# Patient Record
(demographics unavailable — no encounter records)

---

## 2017-01-03 NOTE — EDDOCDS
Nurse's Notes                                                                                     

[facility]                                                                         

Name: Oleksandr Goel                                                                              

Age: 43 yrs                                                                                       

Sex: Male                                                                                         

: 1973                                                                                   

MRN: [id number]                                                                                     

Arrival Date: 2017                                                                          

Time: 09:38                                                                                       

Account#: [id number]                                                                              

Bed 30                                                                                            

Private MD: Unknown Pcp                                                                           

Diagnosis: Panic disorder [episodic paroxysmal anxiety] without agoraphobia                       

                                                                                                  

Presentation:                                                                                     

                                                                                             

09:45 Presenting complaint: Patient states: i woke up at 0600 with heart racing, SOB and      srm 

      weakness. when asked if hes feeling anxious pt states " too the extreme". symptoms          

      worse than normal for his anxiety attacks. denies SI or HI.                                 

09:48 Mental Health Triage Level: Level 1- Pt displays no suicidal or homicidal ideations and srm 

      does not appear to be a danger to self or others. Adult Sepsis Screening: The patient       

      does not have new or worsening altered mentation. Patient's respiratory rate is less        

      than 22. Systolic blood pressure is greater than 100. Patient has a qSOFA score of 0-       

      Negative Sepsis Screen. Suicide/Homicide risk assessment- Patient denies SI and HI but      

      presents with another emotional, behavioral or other mental health complaint.               

      Suicide/Homicide risk assessment- The patient reports that he/she has not been admitted     

      to an inpatient mental health facility in the last 30 days. The patient reports that        

      he/she does not have a recent or current history of substance abuse. The patient            

      reports that he/she has no prior history of suicide attempt and/or organized plan. The      

      patient reports that he/she has not experienced a significant life altering event in        

      the last 30 days. The patient reports that he/she has adequate social support.      

      Status: Patient is not a  or  dependent. Transition of care:          

      patient was not received from another setting of care.                                      

09:48 Acuity: ABUNDIO Level 3                                                                     srm 

09:48 Method Of Arrival: Walkin/Carried/Asstd                                                 srm 

                                                                                                  

Triage Assessment:                                                                                

09:48 General: Appears in no apparent distress, Behavior is appropriate for age, cooperative. srm 

      Pain: Denies pain. HIV screening NA for this visit Offered previously.                      

                                                                                                  

Historical:                                                                                       

- Allergies: Vicodin;                                                                             

- Home Meds:                                                                                      

1. lithium carbonate 300 mg Oral cap 1 cap every other day                                      

     (Last dose: 2017)                                                                      

2. Seroquel 50 mg Oral tab nightly                                                              

     (Last dose: 2017)                                                                      

3. sertraline 100 mg oral tab 2 tabs once daily                                                 

     (Last dose: 2017)                                                                      

4. pantoprazole 40 mg oral tab 1 tab once daily                                                 

5. Ventolin Rotahaler/Rotacaps Inhl                                                             

6. diazepam 5 mg Oral tab 1 tab daily hasnt taken in awhile- a week or more                     

- PMHx: Anxiety;                                                                                  

- PSHx: Cholecystectomy;                                                                          

- Social history: Smoking status: Patient states was never smoker of tobacco. No                  

barriers to communication noted, The patient speaks fluent English, Speaks                      

appropriately for age.                                                                          

- Family history: Not pertinent.                                                                  

- : The pt / caregiver states he / she is not on anticoagulants. Home medication list             

is obtained from the patient.                                                                   

- Exposure Risk Screening:: None identified.                                                      

                                                                                                  

                                                                                                  

Screenin:51 Screening information is obtained from the patient. Fall risk: No risks identified.     srm 

      Assistance ADL's: requires no assistance with activities of daily living. Abuse/DV          

      Screen: The patient / caregiver reports he/she is: not in a situation that causes fear,     

      pain or injury. Nutritional screening: No deficits noted. Advance Directives: There is      

      no active DNR order. home support is adequate.                                              

                                                                                                  

Assessment:                                                                                       

09:51 General: Appears in no apparent distress, Behavior is appropriate for age, cooperative. srm 

      Neurological: No deficits noted. EENT: No deficits noted. Cardiovascular: Reports           

      palpitations, shortness of breath. Respiratory: No deficits noted. GI: No deficits          

      noted.                                                                                      

10:45 General: Appears in no apparent distress, Behavior is appropriate for age, cooperative, srm 

      AWAITING PROVIDER EXAM.                                                                     

10:56 General: Appears in no apparent distress,  in speaking with pt.            4 

11:33 General: Appears in no apparent distress.                                               4 

11:34 Reassessment: Patient states feeling better. Patient states symptoms have improved.     4 

                                                                                                  

Social Work Consult:                                                                              

10:59 Social Work Note: Pt presented c/o anxiety. Pt states he is in treatment at [facility]  

      [facility] clinic, has appointment with Ban Wagner next Tuesday. Pt denies SI/HI, no     

      AH/VH. Pt states he came to ED primarily because he was having "palpitations" and is        

      more concerned about getting something for his palpitations. Pt denies any unmet            

      psycho-social needs at this time.                                                           

                                                                                                  

Psych:                                                                                            

09:52 Mental Health Triage Level: Level 1- Pt displays no suicidal or homicidal ideations and srm 

      does not appear to be a danger to self or others.                                           

09:52 Objective: Patient is cooperative,  Speech is normal.                                       

                                                                                                  

Vital Signs:                                                                                      

09:41  / 67; Pulse 69; Resp 18; Temp 97.4(O); Pulse Ox 98% ; Weight 107.05 kg; Height 6 cmb 

      ft. 0 in. (182.88 cm); Pain 0/10;                                                           

11:35  / 72; Pulse 65; Resp 20; Temp 97.9; Pulse Ox 96% ; Pain 0/10;                    jam1

09:41 Body Mass Index 32.01 (107.05 kg, 182.88 cm)                                            cmb 

                                                                                                  

Vitals:                                                                                           

09:41 Log In Time: 2017 at 09:38.                                                 cmb 

                                                                                                  

ED Course:                                                                                        

09:40 Patient visited by Sadaf Hampton.                                                    cmb 

09:40 Patient moved to Waiting                                                                cmb 

09:41 Unknown Pcp is Private Physician.                                                       cmb 

09:49 Triage Initiated                                                                        srm 

09:50 Patient moved to 30                                                                     srm 

09:51 The patient / caregiver is instructed regarding the plan of care and ED course. Patient srm 

      has correct armband on for positive identification.                                         

10:00 EKG done. (by ED staff). Reviewed by Anita Wilde MD.                          srm 

10:45 Patient visited by Sneha Gomes RN.                                                srm 

11:15 [facility]                                                        ar2 

11:15 Anita Wilde MD is Attending Physician.                                      ar2 

11:16 Patient visited by Marcio Jha PA-C.                                             ar2 

11:34 No IV's were initiated during this patient's visit. No procedures done that require     mk4 

      assistance.                                                                                 

                                                                                                  

Order Results:                                                                                    

There are currently no results for this order.                                                    

Outcome:                                                                                          

11:26 Discharge ordered by Provider.                                                          ar2 

11:34 Discharge Assessment: Patient awake, alert and oriented x 3. No cognitive and/or        mk4 

      functional deficits noted. Patient verbalized understanding of disposition                  

      instructions. Patient awake and alert. patient administered narcotics - no. The             

      following High Risk Discharge criteria are identified: None. Discharged to home             

      ambulatory. Condition: good Condition: stable. No special radiology studies were            

      completed. Property sent home with patient.                                                 

11:36 Patient left the ED.                                                                    mk4 

                                                                                                  

Signatures:                                                                                       

Sneha Gomes, JOSE JUAN                    RN   srm                                                  

Nayeli Silverman, GAURAV                       PCA  Demetrius Jimenez, PSA                       PSA  ac                                                   

Marcio Jha PA-C                 PASindyC ar2                                                  

Sadaf Hampton Margaret, RN                      RN   mk4                                                  

                                                                                                  

**************************************************************************************************

MTDD

## 2017-01-03 NOTE — EDDOCDS
Physician Documentation                                                                           

[facility]                                                                         

Name: Oleksandr Goel                                                                              

Age: 43 yrs                                                                                       

Sex: Male                                                                                         

: 1973                                                                                   

MRN: [id number]                                                                                     

Arrival Date: 2017                                                                          

Time: 09:38                                                                                       

Account#: [id number]                                                                              

Bed 30                                                                                            

Private MD: Unknown Pcp                                                                           

Disposition:                                                                                      

17 11:26 Discharged to Home/Self Care. Impression: Panic disorder [episodic paroxysmal      

anxiety] without agoraphobia.                                                                   

- Condition is Stable.                                                                            

- Discharge Instructions: Panic Attacks.                                                          

- Prescriptions for Hydroxyzine HCl 25 mg Oral Tablet - take 1 tablet by ORAL route               

every 6 hours As needed; 30 tablet.                                                             

- Medication Reconciliation, Local Pharmacy Hours form.                                           

- Follow up: Private Physician; When: As previously arranged; Reason: Recheck today's             

complaints, Continuance of care. Follow up: Emergency Department; When: As needed;              

Reason: Worsening of conditions.                                                                

- Problem is new.                                                                                 

- Symptoms have improved.                                                                         

                                                                                                  

                                                                                                  

                                                                                                  

Historical:                                                                                       

- Allergies: Vicodin;                                                                             

- Home Meds:                                                                                      

1. lithium carbonate 300 mg Oral cap 1 cap every other day                                      

     (Last dose: 2017)                                                                      

2. Seroquel 50 mg Oral tab nightly                                                              

     (Last dose: 2017)                                                                      

3. sertraline 100 mg oral tab 2 tabs once daily                                                 

     (Last dose: 2017)                                                                      

4. pantoprazole 40 mg oral tab 1 tab once daily                                                 

5. Ventolin Rotahaler/Rotacaps Inhl                                                             

6. diazepam 5 mg Oral tab 1 tab daily hasnt taken in awhile- a week or more                     

- PMHx: Anxiety;                                                                                  

- PSHx: Cholecystectomy;                                                                          

- Social history: Smoking status: Patient states was never smoker of tobacco. No                  

barriers to communication noted, The patient speaks fluent English, Speaks                      

appropriately for age.                                                                          

- Family history: Not pertinent.                                                                  

- : The pt / caregiver states he / she is not on anticoagulants. Home medication list             

is obtained from the patient.                                                                   

- Exposure Risk Screening:: None identified.                                                      

                                                                                                  

                                                                                                  

Vital Signs:                                                                                      

                                                                                             

09:41  / 67; Pulse 69; Resp 18; Temp 97.4(O); Pulse Ox 98% ; Weight 107.05 kg / 236     cmb 

      lbs; Height 6 ft. 0 in. (182.88 cm); Pain 0/10;                                             

11:35  / 72; Pulse 65; Resp 20; Temp 97.9; Pulse Ox 96% ; Pain 0/10;                    jam1

09:41 Body Mass Index 32.01 (107.05 kg, 182.88 cm)                                            cmb 

                                                                                                  

MDM:                                                                                              

09:50 ECG WITH READING ER PHYS+CARDIAG ordered.                                               EDMS

10:45 REGULAR DIET PLASTIC WARE+DIET ordered.                                                 EDMS

11:34 Financial registration complete.                                                        lg  

                                                                                                  

Signatures:                                                                                       

Dispatcher [employer]                           EDMS                                                 

Sneha Gomes, RN                    RN   srm                                                  

Anjali Arvizu, Reg                    Reg  lg                                                   

Marcio Jha PA-C PA-C ar2                                                  

Jaqueline Javier RN                      RN   mk4                                                  

                                                                                                  

**************************************************************************************************

MTDD

## 2017-01-04 NOTE — ECGEPIP
Stationary ECG Study

                           [facility] - ED

                                       

                                       Test Date:    2017

Pat Name:     ANITHA SILVER           Department:   

Patient ID:   [id number]                 Room:         -

Gender:       M                        Technician:   lily

:          1973               Requested By: Anita Wilde 

Order Number: [id number]     Reading MD:   Anita Wilde

                                 Measurements

Intervals                              Axis          

Rate:         63                       P:            42

FL:           137                      QRS:          -20

QRSD:         108                      T:            22

QT:           375                                    

QTc:          384                                    

                           Interpretive Statements

SINUS RHYTHM

?PRIOR INFERIOR INFARCT COMPARED 16

Electronically Signed On 2017 19:35:45 EST by Anita Wilde

## 2017-01-05 NOTE — EDDOCDS
Nurse's Notes                                                                                     

[facility]                                                                         

Name: Oleksandr Goel                                                                              

Age: 43 yrs                                                                                       

Sex: Male                                                                                         

: 1973                                                                                   

MRN: [id number]                                                                                     

Arrival Date: 2017                                                                          

Time: 09:38                                                                                       

Account#: [id number]                                                                              

Bed 30                                                                                            

Private MD: Unknown Pcp                                                                           

Diagnosis: Panic disorder [episodic paroxysmal anxiety] without agoraphobia                       

                                                                                                  

Presentation:                                                                                     

                                                                                             

09:45 Presenting complaint: Patient states: i woke up at 0600 with heart racing, SOB and      srm 

      weakness. when asked if hes feeling anxious pt states " too the extreme". symptoms          

      worse than normal for his anxiety attacks. denies SI or HI.                                 

09:48 Mental Health Triage Level: Level 1- Pt displays no suicidal or homicidal ideations and srm 

      does not appear to be a danger to self or others. Adult Sepsis Screening: The patient       

      does not have new or worsening altered mentation. Patient's respiratory rate is less        

      than 22. Systolic blood pressure is greater than 100. Patient has a qSOFA score of 0-       

      Negative Sepsis Screen. Suicide/Homicide risk assessment- Patient denies SI and HI but      

      presents with another emotional, behavioral or other mental health complaint.               

      Suicide/Homicide risk assessment- The patient reports that he/she has not been admitted     

      to an inpatient mental health facility in the last 30 days. The patient reports that        

      he/she does not have a recent or current history of substance abuse. The patient            

      reports that he/she has no prior history of suicide attempt and/or organized plan. The      

      patient reports that he/she has not experienced a significant life altering event in        

      the last 30 days. The patient reports that he/she has adequate social support.      

      Status: Patient is not a  or  dependent. Transition of care:          

      patient was not received from another setting of care.                                      

09:48 Acuity: ABUNDIO Level 3                                                                     srm 

09:48 Method Of Arrival: Walkin/Carried/Asstd                                                 srm 

                                                                                                  

Triage Assessment:                                                                                

09:48 General: Appears in no apparent distress, Behavior is appropriate for age, cooperative. srm 

      Pain: Denies pain. HIV screening NA for this visit Offered previously.                      

                                                                                                  

Historical:                                                                                       

- Allergies: Vicodin;                                                                             

- Home Meds:                                                                                      

1. lithium carbonate 300 mg Oral cap 1 cap every other day                                      

     (Last dose: 2017)                                                                      

2. Seroquel 50 mg Oral tab nightly                                                              

     (Last dose: 2017)                                                                      

3. sertraline 100 mg oral tab 2 tabs once daily                                                 

     (Last dose: 2017)                                                                      

4. pantoprazole 40 mg oral tab 1 tab once daily                                                 

5. Ventolin Rotahaler/Rotacaps Inhl                                                             

6. diazepam 5 mg Oral tab 1 tab daily hasnt taken in awhile- a week or more                     

- PMHx: Anxiety;                                                                                  

- PSHx: Cholecystectomy;                                                                          

- Social history: Smoking status: Patient states was never smoker of tobacco. No                  

barriers to communication noted, The patient speaks fluent English, Speaks                      

appropriately for age.                                                                          

- Family history: Not pertinent.                                                                  

- : The pt / caregiver states he / she is not on anticoagulants. Home medication list             

is obtained from the patient.                                                                   

- Exposure Risk Screening:: None identified.                                                      

                                                                                                  

                                                                                                  

Screenin:51 Screening information is obtained from the patient. Fall risk: No risks identified.     srm 

      Assistance ADL's: requires no assistance with activities of daily living. Abuse/DV          

      Screen: The patient / caregiver reports he/she is: not in a situation that causes fear,     

      pain or injury. Nutritional screening: No deficits noted. Advance Directives: There is      

      no active DNR order. home support is adequate.                                              

                                                                                                  

Assessment:                                                                                       

09:51 General: Appears in no apparent distress, Behavior is appropriate for age, cooperative. srm 

      Neurological: No deficits noted. EENT: No deficits noted. Cardiovascular: Reports           

      palpitations, shortness of breath. Respiratory: No deficits noted. GI: No deficits          

      noted.                                                                                      

10:45 General: Appears in no apparent distress, Behavior is appropriate for age, cooperative, srm 

      AWAITING PROVIDER EXAM.                                                                     

10:56 General: Appears in no apparent distress,  in speaking with pt.            4 

11:33 General: Appears in no apparent distress.                                               4 

11:34 Reassessment: Patient states feeling better. Patient states symptoms have improved.     4 

                                                                                                  

Social Work Consult:                                                                              

10:59 Social Work Note: Pt presented c/o anxiety. Pt states he is in treatment at [facility]  

      Wellness clinic, has appointment with Ban Wagner next Tuesday. Pt denies SI/HI, no     

      AH/VH. Pt states he came to ED primarily because he was having "palpitations" and is        

      more concerned about getting something for his palpitations. Pt denies any unmet            

      psycho-social needs at this time.                                                           

                                                                                                  

Psych:                                                                                            

09:52 Mental Health Triage Level: Level 1- Pt displays no suicidal or homicidal ideations and srm 

      does not appear to be a danger to self or others.                                           

09:52 Objective: Patient is cooperative,  Speech is normal.                                       

                                                                                                  

Vital Signs:                                                                                      

09:41  / 67; Pulse 69; Resp 18; Temp 97.4(O); Pulse Ox 98% ; Weight 107.05 kg; Height 6 cmb 

      ft. 0 in. (182.88 cm); Pain 0/10;                                                           

11:35  / 72; Pulse 65; Resp 20; Temp 97.9; Pulse Ox 96% ; Pain 0/10;                    jam1

09:41 Body Mass Index 32.01 (107.05 kg, 182.88 cm)                                            cmb 

                                                                                                  

Vitals:                                                                                           

09:41 Log In Time: 2017 at 09:38.                                                 cmb 

                                                                                                  

ED Course:                                                                                        

09:40 Patient visited by Sadaf Hampton.                                                    cmb 

09:40 Patient moved to Waiting                                                                cmb 

09:41 Unknown Pcp is Private Physician.                                                       cmb 

09:49 Triage Initiated                                                                        srm 

09:50 Patient moved to 30                                                                     srm 

09:51 The patient / caregiver is instructed regarding the plan of care and ED course. Patient srm 

      has correct armband on for positive identification.                                         

10:00 EKG done. (by ED staff). Reviewed by Anita Wilde MD.                          srm 

10:45 Patient visited by Sneha Gomes RN.                                                srm 

11:15 Marcio Jha PA-C is PHCP.                                                        ar2 

11:15 Anita Wilde MD is Attending Physician.                                      ar2 

11:16 Patient visited by Marcio Jha PA-C.                                             ar2 

11:34 No IV's were initiated during this patient's visit. No procedures done that require     mk4 

      assistance.                                                                                 

12:38 T-Sheet-- Draft Copy was scanned into [email or web address] and attached to record.                   gb  

12:38 ECG/EKG was scanned into [email or web address] and attached to record.                                gb  

                                                                                             

07:51 [facility] Payment Agreement was scanned into [email or web address] and attached to record.               lg  

20:04 EKG-ADULT Returned.                                                                     EDMS

                                                                                                  

Order Results:                                                                                    

                                                                                                  

Radiology Order: EKG-ADULT                                                                        

      Test: EKG-ADULT                                                                             

      REASON FOR EXAMINATION: heart racing; Stationary ECG Study; [facility] - ED; ;      

      Test Date: 2017; Pat Name: OLEKSANDR GOEL Department:; Patient ID: [id number] Room:     

      -; Gender: M Technician: lily; : 1973 Requested By: Anita Wilde; Order     

      Number: [id number] Reading MD: Anita Wilde; Measurements; Intervals     

      Axis; Rate: 63 P: 42; WV: 137 QRS: -20; QRSD: 108 T: 22; QT: 375; QTc: 384;                 

      Interpretive Statements; SINUS RHYTHM; ?PRIOR INFERIOR INFARCT COMPARED 16;           

      Electronically Signed On 2017 19:35:45 EST by Anita Wilde;                    

Outcome:                                                                                          

                                                                                             

11:26 Discharge ordered by Provider.                                                          ar2 

11:34 Discharge Assessment: Patient awake, alert and oriented x 3. No cognitive and/or        mk4 

      functional deficits noted. Patient verbalized understanding of disposition                  

      instructions. Patient awake and alert. patient administered narcotics - no. The             

      following High Risk Discharge criteria are identified: None. Discharged to home             

      ambulatory. Condition: good Condition: stable. No special radiology studies were            

      completed. Property sent home with patient.                                                 

11:36 Patient left the ED.                                                                    mk4 

                                                                                                  

Signatures:                                                                                       

Dispatcher [employer]                           EDMS                                                 

Sneha Gomes, RN                    RN   srm                                                  

Nayeli Silverman, PCA                       PCA  jam1                                                 

Demetrius Murguia, PSA                       PSA  ac                                                   

Whitney Flores, Reg                  Reg  gb                                                   

Anjali Arvizu, Reg                    Reg  lg                                                   

Marcio Jha, PA-CHAPARRITA                 PAJAZMYNE ar2                                                  

Sadaf Hampton Margaret, RN                      RN   mk4                                                  

                                                                                                  

**************************************************************************************************



*** Chart Complete ***
MTDD

## 2017-01-05 NOTE — EDDOCDS
Physician Documentation                                                                           

[facility]                                                                         

Name: Oleksandr Goel                                                                              

Age: 43 yrs                                                                                       

Sex: Male                                                                                         

: 1973                                                                                   

MRN: [id number]                                                                                     

Arrival Date: 2017                                                                          

Time: 09:38                                                                                       

Account#: [id number]                                                                              

Bed 30                                                                                            

Private MD: Unknown Pcp                                                                           

Disposition:                                                                                      

17 11:26 Discharged to Home/Self Care. Impression: Panic disorder [episodic paroxysmal      

anxiety] without agoraphobia.                                                                   

- Condition is Stable.                                                                            

- Discharge Instructions: Panic Attacks.                                                          

- Prescriptions for Hydroxyzine HCl 25 mg Oral Tablet - take 1 tablet by ORAL route               

every 6 hours As needed; 30 tablet.                                                             

- Medication Reconciliation, Local Pharmacy Hours form.                                           

- Follow up: Private Physician; When: As previously arranged; Reason: Recheck today's             

complaints, Continuance of care. Follow up: Emergency Department; When: As needed;              

Reason: Worsening of conditions.                                                                

- Problem is new.                                                                                 

- Symptoms have improved.                                                                         

                                                                                                  

                                                                                                  

                                                                                                  

Historical:                                                                                       

- Allergies: Vicodin;                                                                             

- Home Meds:                                                                                      

1. lithium carbonate 300 mg Oral cap 1 cap every other day                                      

     (Last dose: 2017)                                                                      

2. Seroquel 50 mg Oral tab nightly                                                              

     (Last dose: 2017)                                                                      

3. sertraline 100 mg oral tab 2 tabs once daily                                                 

     (Last dose: 2017)                                                                      

4. pantoprazole 40 mg oral tab 1 tab once daily                                                 

5. Ventolin Rotahaler/Rotacaps Inhl                                                             

6. diazepam 5 mg Oral tab 1 tab daily hasnt taken in awhile- a week or more                     

- PMHx: Anxiety;                                                                                  

- PSHx: Cholecystectomy;                                                                          

- Social history: Smoking status: Patient states was never smoker of tobacco. No                  

barriers to communication noted, The patient speaks fluent English, Speaks                      

appropriately for age.                                                                          

- Family history: Not pertinent.                                                                  

- : The pt / caregiver states he / she is not on anticoagulants. Home medication list             

is obtained from the patient.                                                                   

- Exposure Risk Screening:: None identified.                                                      

                                                                                                  

                                                                                                  

Vital Signs:                                                                                      

                                                                                             

09:41  / 67; Pulse 69; Resp 18; Temp 97.4(O); Pulse Ox 98% ; Weight 107.05 kg / 236     cmb 

      lbs; Height 6 ft. 0 in. (182.88 cm); Pain 0/10;                                             

11:35  / 72; Pulse 65; Resp 20; Temp 97.9; Pulse Ox 96% ; Pain 0/10;                    jam1

09:41 Body Mass Index 32.01 (107.05 kg, 182.88 cm)                                            cmb 

                                                                                                  

MDM:                                                                                              

09:50 ECG WITH READING ER PHYS+CARDIAG ordered.                                               EDMS

10:45 REGULAR DIET PLASTIC WARE+DIET ordered.                                                 EDMS

11:34 Financial registration complete.                                                        lg  

12:38 T-Sheet-- Draft Copy was scanned into [employer] and attached to record.                   gb  

12:38 ECG/EKG was scanned into [employer] and attached to record.                                gb  

                                                                                             

07:51 [facility] Payment Agreement was scanned into [employer] and attached to record.               lg  

                                                                                                  

Signatures:                                                                                       

Dispatcher [employer]                           EDMS                                                 

Sneha Gomes, RN                    RN   srm                                                  

Whitney Flores, Reg                  Reg  gb                                                   

Anjali Arvizu, Reg                    Reg  lg                                                   

Marcio Jha, PA-C                 PA-C ar2                                                  

Jaqueline Javier RN                      RN   mk4                                                  

                                                                                                  

The chart was reviewed and I authenticate all verbal orders and agree with the evaluation and 
treatment provided.Attachments:

                                                                                             

12:38 T-Sheet-- Draft Copy                                                                    gb  

12:38 ECG/EKG                                                                                 gb  

                                                                                             

07:51 [facility] Payment Agreement                                                                lg  

                                                                                                  

**************************************************************************************************



*** Chart Complete ***
MTDD

## 2017-01-05 NOTE — EDDOCDS
Physician Documentation                                                                           

[facility]                                                                         

Name: Oleksandr Goel                                                                              

Age: 43 yrs                                                                                       

Sex: Male                                                                                         

: 1973                                                                                   

MRN: [id number]                                                                                     

Arrival Date: 2017                                                                          

Time: 09:38                                                                                       

Account#: [id number]                                                                              

Bed 30                                                                                            

Private MD: Unknown Pcp                                                                           

Disposition:                                                                                      

17 11:26 Discharged to Home/Self Care. Impression: Panic disorder [episodic paroxysmal      

anxiety] without agoraphobia.                                                                   

- Condition is Stable.                                                                            

- Discharge Instructions: Panic Attacks.                                                          

- Prescriptions for Hydroxyzine HCl 25 mg Oral Tablet - take 1 tablet by ORAL route               

every 6 hours As needed; 30 tablet.                                                             

- Medication Reconciliation, Local Pharmacy Hours form.                                           

- Follow up: Private Physician; When: As previously arranged; Reason: Recheck today's             

complaints, Continuance of care. Follow up: Emergency Department; When: As needed;              

Reason: Worsening of conditions.                                                                

- Problem is new.                                                                                 

- Symptoms have improved.                                                                         

                                                                                                  

                                                                                                  

                                                                                                  

Historical:                                                                                       

- Allergies: Vicodin;                                                                             

- Home Meds:                                                                                      

1. lithium carbonate 300 mg Oral cap 1 cap every other day                                      

     (Last dose: 2017)                                                                      

2. Seroquel 50 mg Oral tab nightly                                                              

     (Last dose: 2017)                                                                      

3. sertraline 100 mg oral tab 2 tabs once daily                                                 

     (Last dose: 2017)                                                                      

4. pantoprazole 40 mg oral tab 1 tab once daily                                                 

5. Ventolin Rotahaler/Rotacaps Inhl                                                             

6. diazepam 5 mg Oral tab 1 tab daily hasnt taken in awhile- a week or more                     

- PMHx: Anxiety;                                                                                  

- PSHx: Cholecystectomy;                                                                          

- Social history: Smoking status: Patient states was never smoker of tobacco. No                  

barriers to communication noted, The patient speaks fluent English, Speaks                      

appropriately for age.                                                                          

- Family history: Not pertinent.                                                                  

- : The pt / caregiver states he / she is not on anticoagulants. Home medication list             

is obtained from the patient.                                                                   

- Exposure Risk Screening:: None identified.                                                      

                                                                                                  

                                                                                                  

Vital Signs:                                                                                      

                                                                                             

09:41  / 67; Pulse 69; Resp 18; Temp 97.4(O); Pulse Ox 98% ; Weight 107.05 kg / 236     cmb 

      lbs; Height 6 ft. 0 in. (182.88 cm); Pain 0/10;                                             

11:35  / 72; Pulse 65; Resp 20; Temp 97.9; Pulse Ox 96% ; Pain 0/10;                    jam1

09:41 Body Mass Index 32.01 (107.05 kg, 182.88 cm)                                            cmb 

                                                                                                  

MDM:                                                                                              

09:50 ECG WITH READING ER PHYS+CARDIAG ordered.                                               EDMS

10:45 REGULAR DIET PLASTIC WARE+DIET ordered.                                                 EDMS

11:34 Financial registration complete.                                                        lg  

12:38 T-Sheet-- Draft Copy was scanned into [employer] and attached to record.                   gb  

12:38 ECG/EKG was scanned into [employer] and attached to record.                                gb  

                                                                                             

07:51 [facility] Payment Agreement was scanned into [employer] and attached to record.               lg  

                                                                                                  

Signatures:                                                                                       

Dispatcher [employer]                           EDMS                                                 

Sneha Gomes, RN                    RN   srm                                                  

Whitney Flores, Reg                  Reg  gb                                                   

Anjali Arvizu, Reg                    Reg  lg                                                   

Marcio Jha, PA-C                 PA-C ar2                                                  

Jaqueline Javier RN                      RN   [id number]                                                  

                                                                                                  

The chart was reviewed and I authenticate all verbal orders and agree with the evaluation and 
treatment provided.Attachments:

                                                                                             

12:38 T-Sheet-- Draft Copy                                                                    gb  

12:38 ECG/EKG                                                                                 gb  

                                                                                             

07:51 [facility] Payment Agreement                                                                lg  

                                                                                                  

**************************************************************************************************



*** Chart Complete ***
MTDD

## 2017-02-22 NOTE — EDDOCDS
Physician Documentation                                                                           

[facility]                                                                         

Name: Oleksandr Goel                                                                              

Age: 43 yrs                                                                                       

Sex: Male                                                                                         

: 1973                                                                                   

MRN: [id number]                                                                                     

Arrival Date: 2017                                                                          

Time: 14:04                                                                                       

Account#: [id number]                                                                              

Bed PR                                                                                      

Private MD: Tarah Jacob A                                                                     

Disposition:                                                                                      

17 15:08 Discharged to Home/Self Care. Impression: Low back pain.                           

- Condition is Stable.                                                                            

- Discharge Instructions: Back Pain, Adult.                                                       

- Prescriptions for Prednisone 20 mg Oral Tablet - take 2 tablets by ORAL route once              

daily for 5 days TAKE WITH FOOD, EARLIER IN THE DAY.; 10 tablet.                                

- Medication Reconciliation, Local Pharmacy Hours form.                                           

- Follow up: Emergency Department; When: As needed; Reason: Worsening of conditions.              

Follow up: Private Physician; When: 2 - 3 days; Reason: Wound/Symptom Recheck,                  

Recheck today's complaints, Continuance of care.                                                

- Problem is new.                                                                                 

- Symptoms are unchanged.                                                                         

                                                                                                  

                                                                                                  

                                                                                                  

Historical:                                                                                       

- Allergies: Vicodin;                                                                             

- Home Meds:                                                                                      

1. diazepam 5 mg Oral tab 1 tab daily hasnt taken in awhile- a week or more                     

2. lithium carbonate 300 mg Oral cap 1 cap every other day                                      

3. pantoprazole 40 mg oral tab 1 tab once daily                                                 

4. Seroquel 50 mg Oral tab nightly                                                              

5. sertraline 100 mg oral tab 2 tabs once daily                                                 

6. Ventolin Rotahaler/Rotacaps Inhl                                                             

- PMHx: Anxiety;                                                                                  

- PSHx: Cholecystectomy;                                                                          

- Social history: Smoking status: Patient states was never smoker of tobacco. No                  

barriers to communication noted, The patient speaks fluent English.                             

- Family history: Not pertinent.                                                                  

- : The pt / caregiver states he / she is not on anticoagulants. Home medication list             

is obtained from the patient.                                                                   

- Exposure Risk Screening:: None identified.                                                      

                                                                                                  

                                                                                                  

Vital Signs:                                                                                      

                                                                                             

14:07  / 73; Pulse 73; Resp 19; Temp 96.6; Pulse Ox 98% ; Weight 108.86 kg / 240 lbs;   elp 

      Height 6 ft. 0 in. (182.88 cm);                                                             

15:16  / 78; Pulse 65; Resp 18; Temp 97.8(O); Pulse Ox 98% on R/A; Pain 3/10;           ck1 

14:07 Body Mass Index 32.55 (108.86 kg, 182.88 cm)                                            elp 

                                                                                                  

MDM:                                                                                              

14:28 Spine. Lumbosacral, Complete Ordered.                                                   EDMS

14:32 Financial registration complete.                                                        lg  

14:37 [facility] Payment Agreement was scanned into [employer] and attached to record.               lg  

                                                                                                  

Signatures:                                                                                       

Dispatcher [employer]                           EDMS                                                 

Rachael Tan RN RN mcp Ganter, LoriLee, Reg                    Reg  lg                                                   

Missy Summers RN RN   ck1                                                  

Lisa Watts, JACLYN ANAYA dt4                                                  

                                                                                                  

The chart was reviewed and I authenticate all verbal orders and agree with the evaluation and 
treatment provided.Attachments:

14:37 [facility] Payment Agreement                                                                lg  

                                                                                                  

**************************************************************************************************

MTDD

## 2017-02-22 NOTE — EDDOCDS
Nurse's Notes                                                                                     

[facility]                                                                         

Name: Oleksandr Goel                                                                              

Age: 43 yrs                                                                                       

Sex: Male                                                                                         

: 1973                                                                                   

MRN: [id number]                                                                                     

Arrival Date: 2017                                                                          

Time: 14:04                                                                                       

Account#: [id number]                                                                              

Bed PR                                                                                      

Private MD: Tarah Jacob A                                                                     

Diagnosis: Low back pain                                                                          

                                                                                                  

Presentation:                                                                                     

                                                                                             

14:09 Presenting complaint: Patient states: Slipped and fell about a month ago--was seen at   [facility] and was given steroid pill x1 and told to take Motrin. Pain persists--no           

      change, worse in am. Radiates to groin and left leg. Adult Sepsis Screening: The            

      patient does not have new or worsening altered mentation. Patient's respiratory rate is     

      less than 22. Systolic blood pressure is greater than 100. Patient has a qSOFA score of     

      0- Negative Sepsis Screen. Suicide/Homicide risk assessment- the patient denies having      

      any suicidal and/or homicidal ideations and does not present with any other emotional,      

      behavioral or mental health complaints.  Status: Patient is not a service           

      member or  dependent. Transition of care: patient was not received from another     

      setting of care.                                                                            

14:09 Acuity: ABUNDIO Level 4                                                                     [facility] 

14:09 Method Of Arrival: Walkin/Carried/Asstd                                                 [facility] 

                                                                                                  

Triage Assessment:                                                                                

14:11 General: Appears uncomfortable, Behavior is cooperative. Pain: Location: lumbar area,   mcp 

      left low back and right low back Pain currently is 10 out of 10 on a pain scale. HIV        

      screening NA for this visit Offered previously. Neurological: No deficits noted.            

      Respiratory: Airway is patent Respiratory effort is even, unlabored. Derm: Skin is          

      pink, warm & dry. Musculoskeletal: Circulation, motion, and sensation intact.             

                                                                                                  

Historical:                                                                                       

- Allergies: Vicodin;                                                                             

- Home Meds:                                                                                      

1. diazepam 5 mg Oral tab 1 tab daily hasnt taken in awhile- a week or more                     

2. lithium carbonate 300 mg Oral cap 1 cap every other day                                      

3. pantoprazole 40 mg oral tab 1 tab once daily                                                 

4. Seroquel 50 mg Oral tab nightly                                                              

5. sertraline 100 mg oral tab 2 tabs once daily                                                 

6. Ventolin Rotahaler/Rotacaps Inhl                                                             

- PMHx: Anxiety;                                                                                  

- PSHx: Cholecystectomy;                                                                          

- Social history: Smoking status: Patient states was never smoker of tobacco. No                  

barriers to communication noted, The patient speaks fluent English.                             

- Family history: Not pertinent.                                                                  

- : The pt / caregiver states he / she is not on anticoagulants. Home medication list             

is obtained from the patient.                                                                   

- Exposure Risk Screening:: None identified.                                                      

                                                                                                  

                                                                                                  

Screenin:30 Screening information is obtained from the patient. Fall risk: No risks identified.     ck1 

      Assistance ADL's: requires no assistance with activities of daily living. Abuse/DV          

      Screen: The patient / caregiver reports he/she is: not in a situation that causes fear,     

      pain or injury. Nutritional screening: No deficits noted. Advance Directives:               

      Currently, there is no health care proxy. home support is adequate.                         

                                                                                                  

Assessment:                                                                                       

14:31 General: Appears in no apparent distress, comfortable, Behavior is appropriate for age, ck1 

      cooperative. Pain: Location: left low back Pain currently is 7 out of 10 on a pain          

      scale. Pain radiates to left leg. Derm: Skin is intact, is healthy with good turgor,        

      Skin is pink, warm & dry. Musculoskeletal: Circulation, motion, and sensation intact      

      Range of motion intact in all extremities.                                                  

15:17 General: Appears in no apparent distress, comfortable, Behavior is appropriate for age, ck1 

      cooperative. Pain: Location: left low back Pain currently is 3 out of 10 on a pain          

      scale. Neurological: Level of Consciousness is awake, alert, obeys commands, Oriented       

      to person, place, time. Derm: Skin is pink, warm & dry. Musculoskeletal: Circulation,     

      motion, and sensation intact Range of motion intact in all extremities.                     

                                                                                                  

Vital Signs:                                                                                      

14:07  / 73; Pulse 73; Resp 19; Temp 96.6; Pulse Ox 98% ; Weight 108.86 kg; Height 6    elp 

      ft. 0 in. (182.88 cm);                                                                      

15:16  / 78; Pulse 65; Resp 18; Temp 97.8(O); Pulse Ox 98% on R/A; Pain 3/10;           ck1 

14:07 Body Mass Index 32.55 (108.86 kg, 182.88 cm)                                            elp 

                                                                                                  

Vitals:                                                                                           

14:07 Log In Time: 2017 at 14:05.                                                elp 

                                                                                                  

ED Course:                                                                                        

14:05 Patient visited by Ashley Robertson PCA.                                                  elp 

14:05 Patient moved to Waiting                                                                elp 

14:06 Tarah Jacob is Private Physician.                                                    elp 

14:07 Patient visited by Ashley Robertson PCA.                                                  elp 

14:07 Patient moved to Pre RCE                                                                elp 

14:10 Triage Initiated                                                                        [facility] 

14:11 Patient visited by Rachael Tan RN.                                                    [facility] 

14:11 Patient moved to Triage 2                                                               [facility] 

14:20 [facility]                                                           dt4 

14:20 Anita Wilde MD is Attending Physician.                                      dt4 

14:20 Patient visited by Lisa Watts PA-C.                                                dt4 

14:31 The patient / caregiver is instructed regarding the plan of care and ED course.         ck1 

14:31 Patient moved to TR1                                                                    ck1 

14:37 [facility] Payment Agreement was scanned into [employer] and attached to record.               lg  

14:52 Patient visited by Missy Summers,JOSE JUAN.                                              ck1 

15:11 Patient moved to PR1 / 25                                                               ck1 

15:17 No IV's were initiated during this patient's visit. No procedures done that require     ck1 

      assistance.                                                                                 

                                                                                                  

Order Results:                                                                                    

There are currently no results for this order.                                                    

Outcome:                                                                                          

15:08 Discharge ordered by Provider.                                                          dt4 

15:16 Discharge Assessment: Patient awake, alert and oriented x 3. No cognitive and/or        ck1 

      functional deficits noted. Patient verbalized understanding of disposition                  

      instructions. patient administered narcotics - no. The following High Risk Discharge        

      criteria are identified: None. Discharged to home ambulatory. Condition: stable.            

      Discharge instructions given to patient, Instructed on discharge instructions, follow       

      up and referral plans. medication usage, Demonstrated understanding of instructions,        

      medications, Pt was receptive of discharge instructions/ teaching. Prescriptions given      

      X 1. No special radiology studies were completed. Property :Personal belongings             

      accompany Pt.                                                                               

15:17 Patient left the ED.                                                                    ck1 

                                                                                                  

Signatures:                                                                                       

Rachael Tan, JOSE JUAN                        RN   [facility]                                                  

Anjali Arvizu, Reg                    Reg                                                     

Missy Summers,JOSE JUAN                  RN   ck                                                  

Ashley Robertson PCA                      PCA  elp                                                  

Lisa Watts PA-C PA-C dt4                                                  

                                                                                                  

**************************************************************************************************

MTDD

## 2017-02-22 NOTE — REP
LUMBAR SPINE:

 

This study is compared to that on 04/18/2013.

 

The bone density is normal.

 

At L1-L2 left side, there is large bridging osteophyte or spurs unchanged. The

vertebral bodies show degenerative spurring particularly anteriorly. This also

extends or includes the thoracic segments. The disc spaces are maintained.

 

The facets, SI and hip joints are within normal limits.

 

The soft tissues of the abdomen and pelvis are unremarkable.

 

IMPRESSION:

There is degenerative spurring of the spine with bridging osteophytes at L1-L2

left side. No disc space narrowing or other significant change.

 

Unreviewed

## 2017-02-24 NOTE — EDDOCDS
Physician Documentation                                                                           

[facility]                                                                         

Name: Oleksandr Goel                                                                              

Age: 43 yrs                                                                                       

Sex: Male                                                                                         

: 1973                                                                                   

MRN: [id number]                                                                                     

Arrival Date: 2017                                                                          

Time: 14:04                                                                                       

Account#: [id number]                                                                              

Bed PR                                                                                      

Private MD: Tarah Jacob A                                                                     

Disposition:                                                                                      

17 15:08 Discharged to Home/Self Care. Impression: Low back pain.                           

- Condition is Stable.                                                                            

- Discharge Instructions: Back Pain, Adult.                                                       

- Prescriptions for Prednisone 20 mg Oral Tablet - take 2 tablets by ORAL route once              

daily for 5 days TAKE WITH FOOD, EARLIER IN THE DAY.; 10 tablet.                                

- Medication Reconciliation, Local Pharmacy Hours form.                                           

- Follow up: Emergency Department; When: As needed; Reason: Worsening of conditions.              

Follow up: Private Physician; When: 2 - 3 days; Reason: Wound/Symptom Recheck,                  

Recheck today's complaints, Continuance of care.                                                

- Problem is new.                                                                                 

- Symptoms are unchanged.                                                                         

                                                                                                  

                                                                                                  

                                                                                                  

Historical:                                                                                       

- Allergies: Vicodin;                                                                             

- Home Meds:                                                                                      

1. diazepam 5 mg Oral tab 1 tab daily hasnt taken in awhile- a week or more                     

2. lithium carbonate 300 mg Oral cap 1 cap every other day                                      

3. pantoprazole 40 mg oral tab 1 tab once daily                                                 

4. Seroquel 50 mg Oral tab nightly                                                              

5. sertraline 100 mg oral tab 2 tabs once daily                                                 

6. Ventolin Rotahaler/Rotacaps Inhl                                                             

- PMHx: Anxiety;                                                                                  

- PSHx: Cholecystectomy;                                                                          

- Social history: Smoking status: Patient states was never smoker of tobacco. No                  

barriers to communication noted, The patient speaks fluent English.                             

- Family history: Not pertinent.                                                                  

- : The pt / caregiver states he / she is not on anticoagulants. Home medication list             

is obtained from the patient.                                                                   

- Exposure Risk Screening:: None identified.                                                      

                                                                                                  

                                                                                                  

Vital Signs:                                                                                      

                                                                                             

14:07  / 73; Pulse 73; Resp 19; Temp 96.6; Pulse Ox 98% ; Weight 108.86 kg / 240 lbs;   elp 

      Height 6 ft. 0 in. (182.88 cm);                                                             

15:16  / 78; Pulse 65; Resp 18; Temp 97.8(O); Pulse Ox 98% on R/A; Pain 3/10;           ck1 

14:07 Body Mass Index 32.55 (108.86 kg, 182.88 cm)                                            elp 

                                                                                                  

MDM:                                                                                              

14:28 Spine. Lumbosacral, Complete Ordered.                                                   EDMS

14:32 Financial registration complete.                                                          

:37 [facility] Payment Agreement was scanned into [employer] and attached to record.                 

                                                                                             

19:17 T-Sheet-- Draft Copy was scanned into [employer] and attached to record.                   klr 

                                                                                                  

Signatures:                                                                                       

Dispatcher [employer]                           EDMS                                                 

Rachael Tan RN                        RN   Anjali Valle, Reg                    Reg                                                     

Lynn-Missy LopezRN                  RN   ck1                                                  

Lisa Watts, JACLYN                    PAJAZMYNE [id number]                                                  

Kristi Hahn                                                  

                                                                                                  

The chart was reviewed and I authenticate all verbal orders and agree with the evaluation and 
treatment provided.Attachments:

                                                                                             

14:37 [facility] Payment Agreement                                                                  

                                                                                             

19:17 T-Sheet-- Draft Copy                                                                    klr 

                                                                                                  

**************************************************************************************************



*** Chart Complete ***
MTDD

## 2017-02-24 NOTE — EDDOCDS
Physician Documentation                                                                           

[facility]                                                                         

Name: Oleksandr Goel                                                                              

Age: 43 yrs                                                                                       

Sex: Male                                                                                         

: 1973                                                                                   

MRN: [id number]                                                                                     

Arrival Date: 2017                                                                          

Time: 14:04                                                                                       

Account#: [id number]                                                                              

Bed PR                                                                                      

Private MD: Tarah Jacob A                                                                     

Disposition:                                                                                      

17 15:08 Discharged to Home/Self Care. Impression: Low back pain.                           

- Condition is Stable.                                                                            

- Discharge Instructions: Back Pain, Adult.                                                       

- Prescriptions for Prednisone 20 mg Oral Tablet - take 2 tablets by ORAL route once              

daily for 5 days TAKE WITH FOOD, EARLIER IN THE DAY.; 10 tablet.                                

- Medication Reconciliation, Local Pharmacy Hours form.                                           

- Follow up: Emergency Department; When: As needed; Reason: Worsening of conditions.              

Follow up: Private Physician; When: 2 - 3 days; Reason: Wound/Symptom Recheck,                  

Recheck today's complaints, Continuance of care.                                                

- Problem is new.                                                                                 

- Symptoms are unchanged.                                                                         

                                                                                                  

                                                                                                  

                                                                                                  

Historical:                                                                                       

- Allergies: Vicodin;                                                                             

- Home Meds:                                                                                      

1. diazepam 5 mg Oral tab 1 tab daily hasnt taken in awhile- a week or more                     

2. lithium carbonate 300 mg Oral cap 1 cap every other day                                      

3. pantoprazole 40 mg oral tab 1 tab once daily                                                 

4. Seroquel 50 mg Oral tab nightly                                                              

5. sertraline 100 mg oral tab 2 tabs once daily                                                 

6. Ventolin Rotahaler/Rotacaps Inhl                                                             

- PMHx: Anxiety;                                                                                  

- PSHx: Cholecystectomy;                                                                          

- Social history: Smoking status: Patient states was never smoker of tobacco. No                  

barriers to communication noted, The patient speaks fluent English.                             

- Family history: Not pertinent.                                                                  

- : The pt / caregiver states he / she is not on anticoagulants. Home medication list             

is obtained from the patient.                                                                   

- Exposure Risk Screening:: None identified.                                                      

                                                                                                  

                                                                                                  

Vital Signs:                                                                                      

                                                                                             

14:07  / 73; Pulse 73; Resp 19; Temp 96.6; Pulse Ox 98% ; Weight 108.86 kg / 240 lbs;   elp 

      Height 6 ft. 0 in. (182.88 cm);                                                             

15:16  / 78; Pulse 65; Resp 18; Temp 97.8(O); Pulse Ox 98% on R/A; Pain 3/10;           ck1 

14:07 Body Mass Index 32.55 (108.86 kg, 182.88 cm)                                            elp 

                                                                                                  

MDM:                                                                                              

14:28 Spine. Lumbosacral, Complete Ordered.                                                   EDMS

14:32 Financial registration complete.                                                          

:37 [facility] Payment Agreement was scanned into [employer] and attached to record.                 

                                                                                             

19:17 T-Sheet-- Draft Copy was scanned into [employer] and attached to record.                   klr 

                                                                                                  

Signatures:                                                                                       

Dispatcher [employer]                           EDMS                                                 

Rachael Tan RN                        RN   Anjali Valle, Reg                    Reg                                                     

Lynn-Missy LopezRN                  RN   ck1                                                  

Lisa Watts, JACLYN                    PAJAZMYNE [id number]                                                  

Kristi Hahn                                                  

                                                                                                  

The chart was reviewed and I authenticate all verbal orders and agree with the evaluation and 
treatment provided.Attachments:

                                                                                             

14:37 [facility] Payment Agreement                                                                  

                                                                                             

19:17 T-Sheet-- Draft Copy                                                                    klr 

                                                                                                  

**************************************************************************************************



*** Chart Complete ***
MTDD

## 2018-06-03 NOTE — EDDOCDS
Nurse's Notes                                                                                     

[facility]                                                                         

Name: Oleksandr Goel                                                                              

Age: 43 yrs                                                                                       

Sex: Male                                                                                         

: 1973                                                                                   

MRN: [id number]                                                                                     

Arrival Date: 2017                                                                          

Time: 14:04                                                                                       

Account#: [id number]                                                                              

Bed PR                                                                                      

Private MD: Tarah Jacob A                                                                     

Diagnosis: Low back pain                                                                          

                                                                                                  

Presentation:                                                                                     

                                                                                             

14:09 Presenting complaint: Patient states: Slipped and fell about a month ago--was seen at   [facility] and was given steroid pill x1 and told to take Motrin. Pain persists--no           

      change, worse in am. Radiates to groin and left leg. Adult Sepsis Screening: The            

      patient does not have new or worsening altered mentation. Patient's respiratory rate is     

      less than 22. Systolic blood pressure is greater than 100. Patient has a qSOFA score of     

      0- Negative Sepsis Screen. Suicide/Homicide risk assessment- the patient denies having      

      any suicidal and/or homicidal ideations and does not present with any other emotional,      

      behavioral or mental health complaints.  Status: Patient is not a service           

      member or  dependent. Transition of care: patient was not received from another     

      setting of care.                                                                            

14:09 Acuity: ABUNDIO Level 4                                                                     [facility] 

14:09 Method Of Arrival: Walkin/Carried/Asstd                                                 [facility] 

                                                                                                  

Triage Assessment:                                                                                

14:11 General: Appears uncomfortable, Behavior is cooperative. Pain: Location: lumbar area,   mcp 

      left low back and right low back Pain currently is 10 out of 10 on a pain scale. HIV        

      screening NA for this visit Offered previously. Neurological: No deficits noted.            

      Respiratory: Airway is patent Respiratory effort is even, unlabored. Derm: Skin is          

      pink, warm & dry. Musculoskeletal: Circulation, motion, and sensation intact.             

                                                                                                  

Historical:                                                                                       

- Allergies: Vicodin;                                                                             

- Home Meds:                                                                                      

1. diazepam 5 mg Oral tab 1 tab daily hasnt taken in awhile- a week or more                     

2. lithium carbonate 300 mg Oral cap 1 cap every other day                                      

3. pantoprazole 40 mg oral tab 1 tab once daily                                                 

4. Seroquel 50 mg Oral tab nightly                                                              

5. sertraline 100 mg oral tab 2 tabs once daily                                                 

6. Ventolin Rotahaler/Rotacaps Inhl                                                             

- PMHx: Anxiety;                                                                                  

- PSHx: Cholecystectomy;                                                                          

- Social history: Smoking status: Patient states was never smoker of tobacco. No                  

barriers to communication noted, The patient speaks fluent English.                             

- Family history: Not pertinent.                                                                  

- : The pt / caregiver states he / she is not on anticoagulants. Home medication list             

is obtained from the patient.                                                                   

- Exposure Risk Screening:: None identified.                                                      

                                                                                                  

                                                                                                  

Screenin:30 Screening information is obtained from the patient. Fall risk: No risks identified.     ck1 

      Assistance ADL's: requires no assistance with activities of daily living. Abuse/DV          

      Screen: The patient / caregiver reports he/she is: not in a situation that causes fear,     

      pain or injury. Nutritional screening: No deficits noted. Advance Directives:               

      Currently, there is no health care proxy. home support is adequate.                         

                                                                                                  

Assessment:                                                                                       

14:31 General: Appears in no apparent distress, comfortable, Behavior is appropriate for age, ck1 

      cooperative. Pain: Location: left low back Pain currently is 7 out of 10 on a pain          

      scale. Pain radiates to left leg. Derm: Skin is intact, is healthy with good turgor,        

      Skin is pink, warm & dry. Musculoskeletal: Circulation, motion, and sensation intact      

      Range of motion intact in all extremities.                                                  

15:17 General: Appears in no apparent distress, comfortable, Behavior is appropriate for age, ck1 

      cooperative. Pain: Location: left low back Pain currently is 3 out of 10 on a pain          

      scale. Neurological: Level of Consciousness is awake, alert, obeys commands, Oriented       

      to person, place, time. Derm: Skin is pink, warm & dry. Musculoskeletal: Circulation,     

      motion, and sensation intact Range of motion intact in all extremities.                     

                                                                                                  

Vital Signs:                                                                                      

14:07  / 73; Pulse 73; Resp 19; Temp 96.6; Pulse Ox 98% ; Weight 108.86 kg; Height 6    elp 

      ft. 0 in. (182.88 cm);                                                                      

15:16  / 78; Pulse 65; Resp 18; Temp 97.8(O); Pulse Ox 98% on R/A; Pain 3/10;           ck1 

14:07 Body Mass Index 32.55 (108.86 kg, 182.88 cm)                                            elp 

                                                                                                  

Vitals:                                                                                           

14:07 Log In Time: 2017 at 14:05.                                                elp 

                                                                                                  

ED Course:                                                                                        

14:05 Patient visited by Ashley Robertson PCA.                                                  elp 

14:05 Patient moved to Waiting                                                                elp 

14:06 Tarah Jacob is Private Physician.                                                    elp 

14:07 Patient visited by Ashley Robertson PCA.                                                  elp 

14:07 Patient moved to Pre RCE                                                                elp 

14:10 Triage Initiated                                                                        mcp 

14:11 Patient visited by Rachael Tan RN.                                                    mcp 

14:11 Patient moved to Triage 2                                                               mcp 

14:20 [facility]                                                           dt4 

14:20 Anita Wilde MD is Attending Physician.                                      dt4 

14:20 Patient visited by Lisa Watts PA-C.                                                dt4 

14:31 The patient / caregiver is instructed regarding the plan of care and ED course.         ck1 

14:31 Patient moved to TR1                                                                    ck1 

14:37 [facility] Payment Agreement was scanned into [employer] and attached to record.               lg  

14:52 Patient visited by Missy Summers RN.                                              ck1 

15:11 Patient moved to PR1 / 25                                                               ck1 

15:17 No IV's were initiated during this patient's visit. No procedures done that require     ck1 

      assistance.                                                                                 

16:33 Spine. Lumbosacral, Complete Returned.                                                  EDMS

                                                                                             

19:17 T-Sheet-- Draft Copy was scanned into [employer] and attached to record.                   klr 

                                                                                                  

Order Results:                                                                                    

                                                                                                  

Radiology Order: Spine. Lumbosacral, Complete                                                     

      Test: Spine. Lumbosacral, Complete                                                          

      REASON FOR EXAMINATION: low back pain; ; LUMBAR SPINE:; ; This study is compared to         

      that on 2013.; ; The bone density is normal.; ; At L1-L2 left side, there is          

      large bridging osteophyte or spurs unchanged. The; vertebral bodies show degenerative       

      spurring particularly anteriorly. This also; extends or includes the thoracic segments.     

      The disc spaces are maintained.; ; The facets, SI and hip joints are within normal          

      limits.; ; The soft tissues of the abdomen and pelvis are unremarkable.; ; IMPRESSION:;     

      There is degenerative spurring of the spine with bridging osteophytes at L1-L2; left        

      side. No disc space narrowing or other significant change.; ; Unreviewed;                   

Outcome:                                                                                          

                                                                                             

15:08 Discharge ordered by Provider.                                                          dt4 

15:16 Discharge Assessment: Patient awake, alert and oriented x 3. No cognitive and/or        ck1 

      functional deficits noted. Patient verbalized understanding of disposition                  

      instructions. patient administered narcotics - no. The following High Risk Discharge        

      criteria are identified: None. Discharged to home ambulatory. Condition: stable.            

      Discharge instructions given to patient, Instructed on discharge instructions, follow       

      up and referral plans. medication usage, Demonstrated understanding of instructions,        

      medications, Pt was receptive of discharge instructions/ teaching. Prescriptions given      

      X 1. No special radiology studies were completed. Property :Personal belongings             

      accompany Pt.                                                                               

15:17 Patient left the ED.                                                                    ck1 

                                                                                                  

Signatures:                                                                                       

Dispatcher [employer]                           EDRachael Jasso, RN                        RN   Anjali Valle, Jesus                    Reg  Missy Hinson RN                  RN   ck1                                                  

Ashley Robertson, GAURAV                      PCA  Lisa Childress, PA-CHAPARRITA                    PASindyC dt4                                                  

Kristi Hahn                                                  

                                                                                                  

**************************************************************************************************



*** Chart Complete ***
MTDD no